# Patient Record
Sex: MALE | Race: WHITE | NOT HISPANIC OR LATINO | Employment: FULL TIME | URBAN - METROPOLITAN AREA
[De-identification: names, ages, dates, MRNs, and addresses within clinical notes are randomized per-mention and may not be internally consistent; named-entity substitution may affect disease eponyms.]

---

## 2020-03-03 ENCOUNTER — APPOINTMENT (EMERGENCY)
Dept: CT IMAGING | Facility: HOSPITAL | Age: 40
DRG: 580 | End: 2020-03-03
Payer: COMMERCIAL

## 2020-03-03 ENCOUNTER — HOSPITAL ENCOUNTER (INPATIENT)
Facility: HOSPITAL | Age: 40
LOS: 1 days | Discharge: HOME | DRG: 580 | End: 2020-03-04
Attending: EMERGENCY MEDICINE | Admitting: HOSPITALIST
Payer: COMMERCIAL

## 2020-03-03 DIAGNOSIS — L02.91 ABSCESS: Primary | ICD-10-CM

## 2020-03-03 DIAGNOSIS — L03.211 FACIAL CELLULITIS: ICD-10-CM

## 2020-03-03 PROBLEM — Z72.0 TOBACCO ABUSE: Status: ACTIVE | Noted: 2020-03-03

## 2020-03-03 PROBLEM — Z72.0 TOBACCO ABUSE: Chronic | Status: ACTIVE | Noted: 2020-03-03

## 2020-03-03 LAB
ALBUMIN SERPL BCP-MCNC: 4.4 G/DL (ref 3.5–5.7)
ALP SERPL-CCNC: 66 U/L (ref 40–150)
ALT SERPL W P-5'-P-CCNC: 19 U/L (ref 7–52)
ANION GAP SERPL CALCULATED.3IONS-SCNC: 8 MMOL/L (ref 4–13)
APTT PPP: 36 SECONDS (ref 23–37)
AST SERPL W P-5'-P-CCNC: 15 U/L (ref 13–39)
ATRIAL RATE: 79 BPM
BASOPHILS # BLD AUTO: 0 THOUSANDS/ΜL (ref 0–0.1)
BASOPHILS NFR BLD AUTO: 1 % (ref 0–2)
BILIRUB SERPL-MCNC: 0.6 MG/DL (ref 0.2–1)
BUN SERPL-MCNC: 12 MG/DL (ref 7–25)
CALCIUM SERPL-MCNC: 9.2 MG/DL (ref 8.6–10.5)
CHLORIDE SERPL-SCNC: 101 MMOL/L (ref 98–107)
CO2 SERPL-SCNC: 26 MMOL/L (ref 21–31)
CREAT SERPL-MCNC: 0.8 MG/DL (ref 0.7–1.3)
EOSINOPHIL # BLD AUTO: 0.2 THOUSAND/ΜL (ref 0–0.61)
EOSINOPHIL NFR BLD AUTO: 2 % (ref 0–5)
ERYTHROCYTE [DISTWIDTH] IN BLOOD BY AUTOMATED COUNT: 12.7 % (ref 11.5–14.5)
GFR SERPL CREATININE-BSD FRML MDRD: 112 ML/MIN/1.73SQ M
GLUCOSE SERPL-MCNC: 97 MG/DL (ref 65–99)
HCT VFR BLD AUTO: 49.9 % (ref 42–47)
HGB BLD-MCNC: 16.5 G/DL (ref 14–18)
INR PPP: 1.02 (ref 0.9–1.5)
LACTATE SERPL-SCNC: 0.6 MMOL/L (ref 0.5–2)
LYMPHOCYTES # BLD AUTO: 1.5 THOUSANDS/ΜL (ref 0.6–4.47)
LYMPHOCYTES NFR BLD AUTO: 18 % (ref 21–51)
MCH RBC QN AUTO: 31.3 PG (ref 26–34)
MCHC RBC AUTO-ENTMCNC: 33.1 G/DL (ref 31–37)
MCV RBC AUTO: 95 FL (ref 81–99)
MONOCYTES # BLD AUTO: 1.1 THOUSAND/ΜL (ref 0.17–1.22)
MONOCYTES NFR BLD AUTO: 13 % (ref 2–12)
NEUTROPHILS # BLD AUTO: 5.6 THOUSANDS/ΜL (ref 1.4–6.5)
NEUTS SEG NFR BLD AUTO: 66 % (ref 42–75)
P AXIS: 73 DEGREES
PLATELET # BLD AUTO: 204 THOUSANDS/UL (ref 149–390)
PMV BLD AUTO: 9.1 FL (ref 8.6–11.7)
POTASSIUM SERPL-SCNC: 3.8 MMOL/L (ref 3.5–5.5)
PR INTERVAL: 166 MS
PROT SERPL-MCNC: 7.2 G/DL (ref 6.4–8.9)
PROTHROMBIN TIME: 11.8 SECONDS (ref 10.2–13)
QRS AXIS: 72 DEGREES
QRSD INTERVAL: 72 MS
QT INTERVAL: 346 MS
QTC INTERVAL: 396 MS
RBC # BLD AUTO: 5.27 MILLION/UL (ref 4.3–5.9)
SODIUM SERPL-SCNC: 135 MMOL/L (ref 134–143)
T WAVE AXIS: 69 DEGREES
TROPONIN I SERPL-MCNC: <0.03 NG/ML
VENTRICULAR RATE: 79 BPM
WBC # BLD AUTO: 8.4 THOUSAND/UL (ref 4.8–10.8)

## 2020-03-03 PROCEDURE — 87070 CULTURE OTHR SPECIMN AEROBIC: CPT | Performed by: SPECIALIST

## 2020-03-03 PROCEDURE — 99223 1ST HOSP IP/OBS HIGH 75: CPT | Performed by: INTERNAL MEDICINE

## 2020-03-03 PROCEDURE — NC001 PR NO CHARGE: Performed by: PHYSICIAN ASSISTANT

## 2020-03-03 PROCEDURE — 84484 ASSAY OF TROPONIN QUANT: CPT | Performed by: EMERGENCY MEDICINE

## 2020-03-03 PROCEDURE — 87205 SMEAR GRAM STAIN: CPT | Performed by: SPECIALIST

## 2020-03-03 PROCEDURE — 36415 COLL VENOUS BLD VENIPUNCTURE: CPT | Performed by: EMERGENCY MEDICINE

## 2020-03-03 PROCEDURE — 99285 EMERGENCY DEPT VISIT HI MDM: CPT

## 2020-03-03 PROCEDURE — 93010 ELECTROCARDIOGRAM REPORT: CPT | Performed by: INTERNAL MEDICINE

## 2020-03-03 PROCEDURE — 0J910ZZ DRAINAGE OF FACE SUBCUTANEOUS TISSUE AND FASCIA, OPEN APPROACH: ICD-10-PCS | Performed by: SPECIALIST

## 2020-03-03 PROCEDURE — 87186 SC STD MICRODIL/AGAR DIL: CPT | Performed by: SPECIALIST

## 2020-03-03 PROCEDURE — 80053 COMPREHEN METABOLIC PANEL: CPT | Performed by: EMERGENCY MEDICINE

## 2020-03-03 PROCEDURE — 10060 I&D ABSCESS SIMPLE/SINGLE: CPT | Performed by: PHYSICIAN ASSISTANT

## 2020-03-03 PROCEDURE — 85730 THROMBOPLASTIN TIME PARTIAL: CPT | Performed by: EMERGENCY MEDICINE

## 2020-03-03 PROCEDURE — 96374 THER/PROPH/DIAG INJ IV PUSH: CPT

## 2020-03-03 PROCEDURE — 99253 IP/OBS CNSLTJ NEW/EST LOW 45: CPT | Performed by: SPECIALIST

## 2020-03-03 PROCEDURE — 85610 PROTHROMBIN TIME: CPT | Performed by: EMERGENCY MEDICINE

## 2020-03-03 PROCEDURE — 87040 BLOOD CULTURE FOR BACTERIA: CPT | Performed by: EMERGENCY MEDICINE

## 2020-03-03 PROCEDURE — 70487 CT MAXILLOFACIAL W/DYE: CPT

## 2020-03-03 PROCEDURE — 85025 COMPLETE CBC W/AUTO DIFF WBC: CPT | Performed by: EMERGENCY MEDICINE

## 2020-03-03 PROCEDURE — 96361 HYDRATE IV INFUSION ADD-ON: CPT

## 2020-03-03 PROCEDURE — 99285 EMERGENCY DEPT VISIT HI MDM: CPT | Performed by: EMERGENCY MEDICINE

## 2020-03-03 PROCEDURE — 93005 ELECTROCARDIOGRAM TRACING: CPT

## 2020-03-03 PROCEDURE — 87147 CULTURE TYPE IMMUNOLOGIC: CPT | Performed by: SPECIALIST

## 2020-03-03 PROCEDURE — 83605 ASSAY OF LACTIC ACID: CPT | Performed by: EMERGENCY MEDICINE

## 2020-03-03 RX ORDER — ACETAMINOPHEN 325 MG/1
650 TABLET ORAL EVERY 4 HOURS PRN
Status: DISCONTINUED | OUTPATIENT
Start: 2020-03-03 | End: 2020-03-04 | Stop reason: HOSPADM

## 2020-03-03 RX ORDER — NICOTINE 21 MG/24HR
1 PATCH, TRANSDERMAL 24 HOURS TRANSDERMAL DAILY
Status: DISCONTINUED | OUTPATIENT
Start: 2020-03-03 | End: 2020-03-04 | Stop reason: HOSPADM

## 2020-03-03 RX ORDER — CEFEPIME HYDROCHLORIDE 2 G/50ML
2000 INJECTION, SOLUTION INTRAVENOUS EVERY 12 HOURS
Status: DISCONTINUED | OUTPATIENT
Start: 2020-03-03 | End: 2020-03-04

## 2020-03-03 RX ORDER — LIDOCAINE HYDROCHLORIDE AND EPINEPHRINE 10; 10 MG/ML; UG/ML
20 INJECTION, SOLUTION INFILTRATION; PERINEURAL ONCE
Status: COMPLETED | OUTPATIENT
Start: 2020-03-03 | End: 2020-03-03

## 2020-03-03 RX ORDER — ONDANSETRON 2 MG/ML
4 INJECTION INTRAMUSCULAR; INTRAVENOUS EVERY 6 HOURS PRN
Status: DISCONTINUED | OUTPATIENT
Start: 2020-03-03 | End: 2020-03-04 | Stop reason: HOSPADM

## 2020-03-03 RX ADMIN — VANCOMYCIN HYDROCHLORIDE 1750 MG: 1 INJECTION, POWDER, LYOPHILIZED, FOR SOLUTION INTRAVENOUS at 04:44

## 2020-03-03 RX ADMIN — SODIUM CHLORIDE 1000 ML: 0.9 INJECTION, SOLUTION INTRAVENOUS at 01:50

## 2020-03-03 RX ADMIN — IOHEXOL 100 ML: 350 INJECTION, SOLUTION INTRAVENOUS at 02:34

## 2020-03-03 RX ADMIN — VANCOMYCIN HYDROCHLORIDE 1250 MG: 1 INJECTION, POWDER, LYOPHILIZED, FOR SOLUTION INTRAVENOUS at 21:56

## 2020-03-03 RX ADMIN — LIDOCAINE HYDROCHLORIDE,EPINEPHRINE BITARTRATE 20 ML: 10; .01 INJECTION, SOLUTION INFILTRATION; PERINEURAL at 11:15

## 2020-03-03 RX ADMIN — VANCOMYCIN HYDROCHLORIDE 1250 MG: 1 INJECTION, POWDER, LYOPHILIZED, FOR SOLUTION INTRAVENOUS at 14:09

## 2020-03-03 RX ADMIN — ACETAMINOPHEN 650 MG: 325 TABLET ORAL at 11:56

## 2020-03-03 RX ADMIN — ACETAMINOPHEN 650 MG: 325 TABLET ORAL at 23:47

## 2020-03-03 RX ADMIN — CEFEPIME HYDROCHLORIDE 2000 MG: 2 INJECTION, SOLUTION INTRAVENOUS at 19:41

## 2020-03-03 RX ADMIN — CEFEPIME HYDROCHLORIDE 2000 MG: 2 INJECTION, SOLUTION INTRAVENOUS at 08:26

## 2020-03-03 NOTE — CONSULTS
Consult- Sakina Abdalla 1980, 35 y o  male MRN: [id number]    Unit/Bed#: ICU 06 Encounter: [id number]    Primary Care Provider: Ruth Bose DO   Date and time admitted to hospital: 3/3/2020  1:15 AM      Inpatient consult to Acute Care Surgery  Consult performed by: Lexi Nelson PA-C  Consult ordered by: Maki Ardon PA-C          * Facial cellulitis with abcess  [facility] day 1 with facial cellulitis and abscess of the mid forehead noted on CT  Clinically stable, pain controlled, no active drainage  Hemodynamically stable, AFVSS  Hematologically stable, labs WNL, WBC 8k, blood cultures x 2 pending  Abscess appears amenable to bedside I&D and cultures  Have discussed with Dr Jennifer Varghese who will evaluate the patient later this morning  Continue IV fluids, IV antibiotics with cefepime/vancomycin  Follow-up on blood cultures when available  Wound care orders following procedure  History of Present Illness   HPI:  Sakina Abdalla is a 35 y o  male admitted with facial cellulitis and forehead abscess by CT  Patient popped a pimple on his mid forehead on Saturday 02/29/2020  [employer] recurred on Sunday 03/01/2020 and he again manually expressed purulence from it  On Monday 03/02/2020 there is no recurrence however after working noted significant swelling of the forehead and eyes bilaterally  He tried to drain it with a pin without success  He notes he could see something white under the skin but could not remove it  He took 2 Benadryl yesterday and went to bed  He woke up until the night with discomfort prompting evaluation in the ER  No associated fevers or chills  Able to open eyes with normal vision bilaterally  Pain currently mild at rest   No prior history a similar process  Denies having a lump in the area prior to the onset of this acute condition  Review of Systems   Constitutional: Negative for chills and fever     Respiratory: Negative for shortness of breath  Cardiovascular: Negative for chest pain  Gastrointestinal: Negative for abdominal pain  Skin: Positive for wound  Negative for rash  Neurological: Negative for headaches  Historical Information   History reviewed  No pertinent past medical history  History reviewed  No pertinent surgical history    Social History   Social History     Substance and Sexual Activity   Alcohol Use Never    Frequency: Never     Social History     Substance and Sexual Activity   Drug Use Yes    Types: Marijuana     Social History     Tobacco Use   Smoking Status Current Every Day Smoker    Packs/day: 1 00   Smokeless Tobacco Never Used     E-Cigarette/Vaping     E-Cigarette/Vaping Substances     Family History:   Family History   Problem Relation Age of Onset    Cancer Mother     Diabetes Neg Hx     Heart disease Neg Hx        Meds/Allergies   PTA meds:   None     No Known Allergies    Objective   First Vitals:   Blood Pressure: 127/79 (03/03/20 0121)  Pulse: 100 (03/03/20 0121)  Temperature: 99 1 °F (37 3 °C) (03/03/20 0121)  Temp Source: Tympanic (03/03/20 0121)  Respirations: 18 (03/03/20 0121)  Height: 6' (182 9 cm) (03/03/20 0121)  Weight - Scale: 88 5 kg (195 lb) (03/03/20 0121)  SpO2: 98 % (03/03/20 0121)    Current Vitals:   Blood Pressure: 113/73 (03/03/20 0800)  Pulse: 84 (03/03/20 0800)  Temperature: 98 9 °F (37 2 °C) (03/03/20 0800)  Temp Source: Tympanic (03/03/20 0800)  Respirations: 18 (03/03/20 0800)  Height: 6' (182 9 cm) (03/03/20 6446)  Weight - Scale: 83 kg (182 lb 15 7 oz) (03/03/20 0624)  SpO2: 98 % (03/03/20 0800)      Intake/Output Summary (Last 24 hours) at 3/3/2020 0833  Last data filed at 3/3/2020 1317  Gross per 24 hour   Intake 1395 83 ml   Output    Net 1395 83 ml       Invasive Devices     Peripheral Intravenous Line            Peripheral IV 03/03/20 Right Wrist less than 1 day                Physical Exam   Constitutional: He is oriented to person, place, and time  He appears well-developed and well-nourished  No distress  HENT:   Head: Normocephalic and atraumatic  Eyes: Pupils are equal, round, and reactive to light  Conjunctivae and EOM are normal    Neck: Normal range of motion  Pulmonary/Chest: No respiratory distress  Musculoskeletal: Normal range of motion  Neurological: He is alert and oriented to person, place, and time  Skin: Skin is warm and dry  Capillary refill takes less than 2 seconds  He is not diaphoretic  Psychiatric: He has a normal mood and affect  His behavior is normal            Lab Results:   I have personally reviewed pertinent lab results  , CBC:   Lab Results   Component Value Date    WBC 8 40 03/03/2020    HGB 16 5 03/03/2020    HCT 49 9 (H) 03/03/2020    MCV 95 03/03/2020     03/03/2020    MCH 31 3 03/03/2020    MCHC 33 1 03/03/2020    RDW 12 7 03/03/2020    MPV 9 1 03/03/2020   , CMP:   Lab Results   Component Value Date    SODIUM 135 03/03/2020    K 3 8 03/03/2020     03/03/2020    CO2 26 03/03/2020    BUN 12 03/03/2020    CREATININE 0 80 03/03/2020    CALCIUM 9 2 03/03/2020    AST 15 03/03/2020    ALT 19 03/03/2020    ALKPHOS 66 03/03/2020    EGFR 112 03/03/2020     Imaging: I have personally reviewed pertinent reports  and I have personally reviewed pertinent films in PACS  EKG, Pathology, and Other Studies: I have personally reviewed pertinent reports  Code Status: Level 1 - Full Code  Advance Directive and Living Will:      Power of :    POLST:      Counseling / Coordination of Care  Total floor / unit time spent today 25 minutes  Greater than 50% of total time was spent with the patient and / or family counseling and / or coordination of care  A description of the counseling / coordination of care: patient education, treatment planning

## 2020-03-03 NOTE — PROGRESS NOTES
Vancomycin Assessment    Tiffany Gutiérrez is a 35 y o  male who is currently receiving vancomycin 1250mg IV Q12H for skin-soft tissue infection     Relevant clinical data and objective history reviewed:  Creatinine   Date Value Ref Range Status   03/03/2020 0 80 0 70 - 1 30 mg/dL Final     Comment:     Standardized to IDMS reference method     /83   Pulse 97   Temp 98 9 °F (37 2 °C) (Tympanic)   Resp 18   Ht 6' (1 829 m)   Wt 88 5 kg (195 lb)   SpO2 99%   BMI 26 45 kg/m²   No intake/output data recorded  Lab Results   Component Value Date/Time    BUN 12 03/03/2020 01:46 AM    WBC 8 40 03/03/2020 01:46 AM    HGB 16 5 03/03/2020 01:46 AM    HCT 49 9 (H) 03/03/2020 01:46 AM    MCV 95 03/03/2020 01:46 AM     03/03/2020 01:46 AM     Temp Readings from Last 3 Encounters:   03/03/20 98 9 °F (37 2 °C) (Tympanic)     Vancomycin Days of Therapy: 1    Assessment/Plan  The patient is currently on vancomycin utilizing scheduled dosing based on actual body weight  Baseline risks associated with therapy include: none   The patient is currently receiving 1250mg IV Q12H and after clinical evaluation will be changed to 1250 mg IV Q8H  Pharmacy will also follow closely for s/sx of nephrotoxicity, infusion reactions and appropriateness of therapy  BMP and CBC will be ordered per protocol  Plan for trough as patient approaches steady state, prior to the 4th  dose at approximately 0530 on 3/4/20  Due to infection severity, will target a trough of 15-20 (appropriate for most indications)   Pharmacy will continue to follow the patients culture results and clinical progress daily      Eitan Galvan

## 2020-03-03 NOTE — ED PROVIDER NOTES
History  Chief Complaint   Patient presents with    Abscess     reports facial abscess since this past Saturday     HPI    Patient is a 76-year-old male that reports to the emergency department with a facial abscess that started on Saturday  He notes that he was able to express some pus from the abscess  No lightheadedness or dizziness  No fevers, chills, sweats  He does have an achy headache  No focal neurological defects  No vomiting or diarrhea  No dysuria, hematuria  Currently, the patient has swelling in his forehead, see media image from this date  CN 2-12 intact  GCS 15  AOx3  Achi pain over the forehead causing a headache  No altered mental status  He was able to express some puss from the wound  MDM well appearing 35 yom, concern for subgaleal infection will image  Sp[facility] with dr William Kauf[facility] from [facility], will admit here  Spoke with gen surg Dr Pacheco Rust, he will have his team see the patient in the AM for I/d  None       History reviewed  No pertinent past medical history  History reviewed  No pertinent surgical history  History reviewed  No pertinent family history  I have reviewed and agree with the history as documented  E-Cigarette/Vaping     E-Cigarette/Vaping Substances     Social History     Tobacco Use    Smoking status: Current Every Day Smoker     Packs/day: 1 00    Smokeless tobacco: Never Used   Substance Use Topics    Alcohol use: Never     Frequency: Never    Drug use: Yes     Types: Marijuana       Review of Systems   Skin: Positive for color change and wound  All other systems reviewed and are negative  Physical Exam  Physical Exam   Constitutional: He is oriented to person, place, and time  He appears well-developed and well-nourished  HENT:   Head: Normocephalic and atraumatic  Eyes: Pupils are equal, round, and reactive to light  EOM are normal    Neck: Normal range of motion  Neck supple     Cardiovascular: Normal rate, regular rhythm and normal heart sounds  No murmur heard  Pulmonary/Chest: Effort normal and breath sounds normal  No respiratory distress  He has no wheezes  Abdominal: Soft  Bowel sounds are normal  He exhibits no distension  There is no tenderness  Musculoskeletal: Normal range of motion  He exhibits no edema or tenderness  Neurological: He is alert and oriented to person, place, and time  No cranial nerve deficit  Coordination normal    Skin: Skin is warm and dry  He is not diaphoretic  No erythema  Psychiatric: He has a normal mood and affect  His behavior is normal    Nursing note and vitals reviewed  Vital Signs  ED Triage Vitals [03/03/20 0121]   Temperature Pulse Respirations Blood Pressure SpO2   99 1 °F (37 3 °C) 100 18 127/79 98 %      Temp Source Heart Rate Source Patient Position - Orthostatic VS BP Location FiO2 (%)   Tympanic -- Sitting Left arm --      Pain Score       No Pain           Vitals:    03/03/20 0121   BP: 127/79   Pulse: 100   Patient Position - Orthostatic VS: Sitting         Visual Acuity      ED Medications  Medications   vancomycin (VANCOCIN) 1,750 mg in sodium chloride 0 9 % 500 mL IVPB (1,750 mg Intravenous New Bag 3/3/20 0444)   sodium chloride 0 9 % bolus 1,000 mL (0 mL Intravenous Stopped 3/3/20 0246)   iohexol (OMNIPAQUE) 350 MG/ML injection (SINGLE-DOSE) 100 mL (100 mL Intravenous Given 3/3/20 0234)       Diagnostic Studies  Results Reviewed     Procedure Component Value Units Date/Time    Blood culture #2 [[id number]] Collected:  03/03/20 0146    Lab Status: In process Specimen:  Blood from Arm, Right Updated:  03/03/20 0250    Blood culture #1 [[id number]] Collected:  03/03/20 0206    Lab Status:   In process Specimen:  Blood from Arm, Left Updated:  03/03/20 0249    Comprehensive metabolic panel [[id number]] Collected:  03/03/20 0146    Lab Status:  Final result Specimen:  Blood from Arm, Right Updated:  03/03/20 0219     Sodium 135 mmol/L      Potassium 3 8 mmol/L Chloride 101 mmol/L      CO2 26 mmol/L      ANION GAP 8 mmol/L      BUN 12 mg/dL      Creatinine 0 80 mg/dL      Glucose 97 mg/dL      Calcium 9 2 mg/dL      AST 15 U/L      ALT 19 U/L      Alkaline Phosphatase 66 U/L      Total Protein 7 2 g/dL      Albumin 4 4 g/dL      Total Bilirubin 0 60 mg/dL      eGFR 112 ml/min/1 73sq m     Narrative:       [place] guidelines for Chronic Kidney Disease (CKD):     Stage 1 with normal or high GFR (GFR > 90 mL/min/1 73 square meters)    Stage 2 Mild CKD (GFR = 60-89 mL/min/1 73 square meters)    Stage 3A Moderate CKD (GFR = 45-59 mL/min/1 73 square meters)    Stage 3B Moderate CKD (GFR = 30-44 mL/min/1 73 square meters)    Stage 4 Severe CKD (GFR = 15-29 mL/min/1 73 square meters)    Stage 5 End Stage CKD (GFR <15 mL/min/1 73 square meters)  Note: GFR calculation is accurate only with a steady state creatinine    Troponin I [[id number]]  (Normal) Collected:  03/03/20 0146    Lab Status:  Final result Specimen:  Blood from Arm, Right Updated:  03/03/20 0217     Troponin I <0 03 ng/mL     Lactic acid x2 Q2H [[id number]]  (Normal) Collected:  03/03/20 0147    Lab Status:  Final result Specimen:  Blood from Arm, Right Updated:  03/03/20 0214     LACTIC ACID 0 6 mmol/L     Narrative:       Result may be elevated if tourniquet was used during collection      Prince Abdalla [[id number]]  (Normal) Collected:  03/03/20 0146    Lab Status:  Final result Specimen:  Blood from Arm, Right Updated:  03/03/20 0210     Protime 11 8 seconds      INR 1 02    APTT [[id number]]  (Normal) Collected:  03/03/20 0146    Lab Status:  Final result Specimen:  Blood from Arm, Right Updated:  03/03/20 0210     PTT 36 seconds     CBC and differential [[id number]]  (Abnormal) Collected:  03/03/20 0146    Lab Status:  Final result Specimen:  Blood from Arm, Right Updated:  03/03/20 0205     WBC 8 40 Thousand/uL      RBC 5 27 Million/uL      Hemoglobin 16 5 g/dL      Hematocrit 49 9 % MCV 95 fL      MCH 31 3 pg      MCHC 33 1 g/dL      RDW 12 7 %      MPV 9 1 fL      Platelets 309 Thousands/uL      Neutrophils Relative 66 %      Lymphocytes Relative 18 %      Monocytes Relative 13 %      Eosinophils Relative 2 %      Basophils Relative 1 %      Neutrophils Absolute 5 60 Thousands/µL      Lymphocytes Absolute 1 50 Thousands/µL      Monocytes Absolute 1 10 Thousand/µL      Eosinophils Absolute 0 20 Thousand/µL      Basophils Absolute 0 00 Thousands/µL     Lactic acid x2 Q2H [[id number]]     Lab Status:  No result Specimen:  Blood     UA w Reflex to Microscopic w Reflex to Culture [[id number]]     Lab Status:  No result Specimen:  Urine                  CT facial bones with contrast   Final Result by Alberto Green MD (03/03 0327)      2 5 x 1 5 x 0 5 cm encapsulated collection within the subcutaneous soft tissues of the mid frontal scalp extending to the skin surface compatible with an abscess  There is moderate surrounding subcutaneous inflammatory stranding  No evidence of adjacent osseous erosion  Well aerated bilateral frontal sinuses  Follow-up with ultrasound could be performed to assess for improvement/resolution  Workstation performed: [id number]                    Procedures  Procedures         ED Course                               MDM      Disposition  Final diagnoses:   Abscess     Time reflects when diagnosis was documented in both MDM as applicable and the Disposition within this note     Time User Action Codes Description Comment    3/3/2020  4:56 AM Parth Barboza Add [L02 91] Abscess       ED Disposition     ED Disposition Condition Date/Time Comment    Admit Stable Tue Mar 3, 2020  4:56 AM Case was discussed with preeti ap and the patient's admission status was agreed to be Admission Status: inpatient status to the service of Dr Ronda Rangel           Follow-up Information    None         Patient's Medications    No medications on file     No discharge procedures on file      PDMP Review     None          ED Provider  Electronically Signed by           Rigoberto Medina MD  03/03/20 2311

## 2020-03-03 NOTE — ASSESSMENT & PLAN NOTE
Hospital day 1 with facial cellulitis and abscess of the mid forehead noted on CT  Clinically stable, pain controlled, no active drainage  Hemodynamically stable, AFVSS  Hematologically stable, labs WNL, WBC 8k, blood cultures x 2 pending  Abscess appears amenable to bedside I&D and cultures  Have discussed with Dr Josie Guerrero who will evaluate the patient later this morning  Continue IV fluids, IV antibiotics with cefepime/vancomycin  Follow-up on blood cultures when available  Wound care orders following procedure

## 2020-03-03 NOTE — SOCIAL WORK
Pt was sleeping at 16:25 unable to assess, pt remains in ICU, need to reasses d/c plan and d/c needs,

## 2020-03-03 NOTE — PLAN OF CARE
Problem: PAIN - ADULT  Goal: Verbalizes/displays adequate comfort level or baseline comfort level  Description  Interventions:  - Encourage patient to monitor pain and request assistance  - Assess pain using appropriate pain scale  - Administer analgesics based on type and severity of pain and evaluate response  - Implement non-pharmacological measures as appropriate and evaluate response  - Consider cultural and social influences on pain and pain management  - Notify physician/advanced practitioner if interventions unsuccessful or patient reports new pain  Outcome: Progressing     Problem: INFECTION - ADULT  Goal: Absence or prevention of progression during hospitalization  Description  INTERVENTIONS:  - Assess and monitor for signs and symptoms of infection  - Monitor lab/diagnostic results  - Monitor all insertion sites, i e  indwelling lines, tubes, and drains  - Monitor endotracheal if appropriate and nasal secretions for changes in amount and color  - [place] appropriate cooling/warming therapies per order  - Administer medications as ordered  - Instruct and encourage patient and family to use good hand hygiene technique  - Identify and instruct in appropriate isolation precautions for identified infection/condition  Outcome: Progressing  Goal: Absence of fever/infection during neutropenic period  Description  INTERVENTIONS:  - Monitor WBC    Outcome: Progressing     Problem: SAFETY ADULT  Goal: Patient will remain free of falls  Description  INTERVENTIONS:  - Assess patient frequently for physical needs  -  Identify cognitive and physical deficits and behaviors that affect risk of falls    -  [place] fall precautions as indicated by assessment   - Educate patient/family on patient safety including physical limitations  - Instruct patient to call for assistance with activity based on assessment  - Modify environment to reduce risk of injury  - Consider OT/PT consult to assist with strengthening/mobility  Outcome: Progressing  Goal: Maintain or return to baseline ADL function  Description  INTERVENTIONS:  -  Assess patient's ability to carry out ADLs; assess patient's baseline for ADL function and identify physical deficits which impact ability to perform ADLs (bathing, care of mouth/teeth, toileting, grooming, dressing, etc )  - Assess/evaluate cause of self-care deficits   - Assess range of motion  - Assess patient's mobility; develop plan if impaired  - Assess patient's need for assistive devices and provide as appropriate  - Encourage maximum independence but intervene and supervise when necessary  - Involve family in performance of ADLs  - Assess for home care needs following discharge   - Consider OT consult to assist with ADL evaluation and planning for discharge  - Provide patient education as appropriate  Outcome: Progressing  Goal: Maintain or return mobility status to optimal level  Description  INTERVENTIONS:  - Assess patient's baseline mobility status (ambulation, transfers, stairs, etc )    - Identify cognitive and physical deficits and behaviors that affect mobility  - Identify mobility aids required to assist with transfers and/or ambulation (gait belt, sit-to-stand, lift, walker, cane, etc )  - [place] fall precautions as indicated by assessment  - Record patient progress and toleration of activity level on Mobility SBAR; progress patient to next Phase/Stage  - Instruct patient to call for assistance with activity based on assessment  - Consider rehabilitation consult to assist with strengthening/weightbearing, etc   Outcome: Progressing     Problem: DISCHARGE PLANNING  Goal: Discharge to home or other facility with appropriate resources  Description  INTERVENTIONS:  - Identify barriers to discharge w/patient and caregiver  - Arrange for needed discharge resources and transportation as appropriate  - Identify discharge learning needs (meds, wound care, etc )  - Arrange for interpretive services to assist at discharge as needed  - Refer to Case Management Department for coordinating discharge planning if the patient needs post-hospital services based on physician/advanced practitioner order or complex needs related to functional status, cognitive ability, or social support system  Outcome: Progressing     Problem: Knowledge Deficit  Goal: Patient/family/caregiver demonstrates understanding of disease process, treatment plan, medications, and discharge instructions  Description  Complete learning assessment and assess knowledge base    Interventions:  - Provide teaching at level of understanding  - Provide teaching via preferred learning methods  Outcome: Progressing

## 2020-03-03 NOTE — NURSING NOTE
Resting comfortably  Dr Bhaskar Santos recently at bedside for incision and drainage  Consent obtained prior to procedure  Time out as per protocol  Tolerated well  Specimen to lab as obtained  Rails up times 2; safety maintained

## 2020-03-03 NOTE — ASSESSMENT & PLAN NOTE
· IV antibiotics  · Surgery consult  · Follow up blood cultures  · CT: 2 5 x 1 5 x 0 5 cm encapsulated collection within the subcutaneous soft tissues of the mid frontal scalp extending to the skin surface compatible with an abscess   There is moderate surrounding subcutaneous inflammatory stranding

## 2020-03-03 NOTE — NURSING NOTE
Resting to R side  Afternoon assessment as charted  Remains with B/L facial/eye and forehead edema  No c/o headache, nausea or increasing pain  States, "I feel like my eyes and face are more swollen"  Describes edema as tightness  Continuing antibiotics as ordered  Rails up times 3 with callbell in reach

## 2020-03-03 NOTE — H&P
H&P- Lashay Mcguire 1980, 35 y o  male MRN: [id number]    Unit/Bed#: ED 03 Encounter: [id number]    Primary Care Provider: Natalie Casillas DO   Date and time admitted to hospital: 3/3/2020  1:15 AM        * Facial cellulitis with abcess  Assessment & Plan  · IV antibiotics  · Surgery consult  · Follow up blood cultures  · CT: 2 5 x 1 5 x 0 5 cm encapsulated collection within the subcutaneous soft tissues of the mid frontal scalp extending to the skin surface compatible with an abscess   There is moderate surrounding subcutaneous inflammatory stranding  Tobacco abuse  Assessment & Plan  · Nicotine patch    VTE Prophylaxis: venadynes for possible I&D  Code Status:  Full code  POLST: POLST is not applicable to this patient  Discussion with patient    Anticipated Length of Stay:  Patient will be admitted on an Inpatient basis with an anticipated length of stay of  > 2 midnights  Justification for Hospital Stay:  IV antibiotics, specialist input    Chief Complaint:   Facial abscess    History of Present Illness:    Lashay Mcguire is a 35 y o  male who presents with facial abscess  Saturday woke up with a small pimple,squeezed and stuff came out  Was out in the garage on Saturday and not sure if he got bit by something  Sunday he was squeezing it all day and getting pus out  Monday he went to work and noted left side of face was swollen in the evening his right side of face swelled  He took 2 benadryl and went to sleep  Woke up with head pounding and decided to come to the ER to get evaluated  Review of Systems:  Review of Systems   Constitutional: Negative for chills  HENT: Positive for facial swelling  Negative for rhinorrhea, sore throat and trouble swallowing  Eyes: Negative for discharge and redness  Respiratory: Negative for cough and shortness of breath  Cardiovascular: Negative for chest pain and leg swelling     Gastrointestinal: Negative for abdominal pain, diarrhea, nausea and vomiting  Genitourinary: Negative for dysuria and hematuria  Musculoskeletal: Negative for back pain, myalgias and neck pain  Skin: Positive for color change  Neurological: Positive for headaches  Negative for dizziness  Psychiatric/Behavioral: Negative for agitation and confusion  Past Medical and Surgical History:   History reviewed  No pertinent past medical history  History reviewed  No pertinent surgical history  Meds/Allergies:  Prior to Admission medications    Not on File     I have reviewed home medications with patient personally  Allergies: No Known Allergies    Social History:  Marital Status: Single   Occupation:  for [employer]  Patient Pre-hospital Living Situation:  Home  Patient Pre-hospital Level of Mobility:  Mobile  Patient Pre-hospital Diet Restrictions:  None  Substance Use History:     Social History     Substance and Sexual Activity   Alcohol Use Never    Frequency: Never     Social History     Tobacco Use   Smoking Status Current Every Day Smoker    Packs/day: 1 00   Smokeless Tobacco Never Used     Social History     Substance and Sexual Activity   Drug Use Yes    Types: Marijuana       Family History:  I have reviewed the patients family history    Physical Exam:   Vitals:   Blood Pressure: 129/83 (03/03/20 0535)  Pulse: 97 (03/03/20 0535)  Temperature: 98 9 °F (37 2 °C) (03/03/20 0535)  Temp Source: Tympanic (03/03/20 0535)  Respirations: 18 (03/03/20 0535)  Height: 6' (182 9 cm) (03/03/20 0121)  Weight - Scale: 88 5 kg (195 lb) (03/03/20 0121)  SpO2: 99 % (03/03/20 0535)    Physical Exam   Constitutional: He is oriented to person, place, and time  He appears well-developed and well-nourished  HENT:   Head: Normocephalic and atraumatic  Eyes: Conjunctivae and EOM are normal  Right eye exhibits no discharge  Left eye exhibits no discharge  Significant orbital edema bilateral   Neck: Normal range of motion  No tracheal deviation present  Cardiovascular: Normal rate, regular rhythm and normal heart sounds  Exam reveals no gallop and no friction rub  No murmur heard  Pulmonary/Chest: Effort normal and breath sounds normal  No respiratory distress  He has no wheezes  He has no rales  Abdominal: Soft  Bowel sounds are normal  He exhibits no distension and no mass  There is no tenderness  There is no guarding  Musculoskeletal: Normal range of motion  He exhibits no edema, tenderness or deformity  Neurological: He is alert and oriented to person, place, and time  Skin: Skin is warm and dry  No rash noted  No erythema  No pallor  Psychiatric: He has a normal mood and affect  His behavior is normal  Judgment and thought content normal    Nursing note and vitals reviewed  Additional Data:   Lab Results: I have personally reviewed pertinent reports  Results from last 7 days   Lab Units 03/03/20  0146   WBC Thousand/uL 8 40   HEMOGLOBIN g/dL 16 5   HEMATOCRIT % 49 9*   PLATELETS Thousands/uL 204   NEUTROS PCT % 66   LYMPHS PCT % 18*   MONOS PCT % 13*   EOS PCT % 2     Results from last 7 days   Lab Units 03/03/20  0146   POTASSIUM mmol/L 3 8   CHLORIDE mmol/L 101   CO2 mmol/L 26   BUN mg/dL 12   CREATININE mg/dL 0 80   CALCIUM mg/dL 9 2   ALK PHOS U/L 66   ALT U/L 19   AST U/L 15     Results from last 7 days   Lab Units 03/03/20  0146   INR  1 02       Imaging: I have personally reviewed pertinent reports  CT facial bones with contrast   Final Result by Josselyn Velasco MD (03/03 0327)      2 5 x 1 5 x 0 5 cm encapsulated collection within the subcutaneous soft tissues of the mid frontal scalp extending to the skin surface compatible with an abscess  There is moderate surrounding subcutaneous inflammatory stranding  No evidence of adjacent osseous erosion  Well aerated bilateral frontal sinuses  Follow-up with ultrasound could be performed to assess for improvement/resolution           Workstation performed: [id number] [employer] Records Reviewed: Yes     ** Please Note: This note has been constructed using a voice recognition system   **

## 2020-03-03 NOTE — UTILIZATION REVIEW
Initial Clinical Review    Admission: Date/Time/Statement: Admission Orders (From admission, onward)     Ordered        03/03/20 0456  Inpatient Admission (expected length of stay for this patient Order details is greater than two midnights)  Once                   Orders Placed This Encounter   Procedures    Inpatient Admission (expected length of stay for this patient Order details is greater than two midnights)     Standing Status:   Standing     Number of Occurrences:   1     Order Specific Question:   Admitting Physician     Answer:   Sarahi Callaway [[id number]]     Order Specific Question:   Level of Care     Answer:   Med Surg [[id number]]     Order Specific Question:   Estimated length of stay     Answer:   More than 2 Midnights     Order Specific Question:   Certification     Answer:   I certify that inpatient services are medically necessary for this patient for a duration of greater than two midnights  See H&P and MD Progress Notes for additional information about the patient's course of treatment  ED Arrival Information     Expected Arrival Acuity Means of Arrival Escorted By Service Admission Type    - 3/3/2020 01:09 Urgent Walk-In Family Member General Medicine Urgent    Arrival Complaint    abscess        Chief Complaint   Patient presents with    Abscess     reports facial abscess since this past Saturday     Assessment/Plan:    34 yo male,  To ER from home,  Admitted INPT status to [facility] for treatment of  Facial cellulitis w/abscess  Pt reports on 2/29 he woke up w/a small pimple on face -  Squeezed it and expressed small amount of exudate  On 3/1 pt reports he repeatedly squeezed area to express pus  On 3/2 noted swelling in Rt side of face, took 2 benadryl  On 3/3  Awakened w/head pounding & continued facial swelling - came to ER  EXAM:  Significant orbital edema bilateral, scalp swelling   Will continue IVAB's, consult surgery,  Follow blood cultures                3/3 SURGICAL CONSULT:   Will perform bedside I&D w/cultures  ED Triage Vitals   Temperature Pulse Respirations Blood Pressure SpO2   03/03/20 0121 03/03/20 0121 03/03/20 0121 03/03/20 0121 03/03/20 0121   99 1 °F (37 3 °C) 100 18 127/79 98 %      Temp Source Heart Rate Source Patient Position - Orthostatic VS BP Location FiO2 (%)   03/03/20 0121 03/03/20 0624 03/03/20 0121 03/03/20 0121 --   Tympanic Monitor Sitting Left arm       Pain Score       03/03/20 0121       No Pain        Wt Readings from Last 1 Encounters:   03/03/20 83 kg (182 lb 15 7 oz)     Additional Vital Signs:   03/03/20 0535  98 9 °F (37 2 °C)  97  18  129/83     03/03/20 0800  98 9 °F (37 2 °C)  84  18  113/73       Pertinent Labs/Diagnostic Test Results:   Ekg/cxr - none     3/3   CT: 2 5 x 1 5 x 0 5 cm encapsulated collection within the subcutaneous soft tissues of the mid frontal scalp extending to the skin surface compatible with an abscess   There is moderate surrounding subcutaneous inflammatory stranding      Results from last 7 days   Lab Units 03/03/20  0146   WBC Thousand/uL 8 40   HEMOGLOBIN g/dL 16 5   HEMATOCRIT % 49 9*   PLATELETS Thousands/uL 204   NEUTROS ABS Thousands/µL 5 60         Results from last 7 days   Lab Units 03/03/20  0146   SODIUM mmol/L 135   POTASSIUM mmol/L 3 8   CHLORIDE mmol/L 101   CO2 mmol/L 26   ANION GAP mmol/L 8   BUN mg/dL 12   CREATININE mg/dL 0 80   EGFR ml/min/1 73sq m 112   CALCIUM mg/dL 9 2     Results from last 7 days   Lab Units 03/03/20  0146   AST U/L 15   ALT U/L 19   ALK PHOS U/L 66   TOTAL PROTEIN g/dL 7 2   ALBUMIN g/dL 4 4   TOTAL BILIRUBIN mg/dL 0 60         Results from last 7 days   Lab Units 03/03/20  0146   GLUCOSE RANDOM mg/dL 97         Results from last 7 days   Lab Units 03/03/20  0146   TROPONIN I ng/mL <0 03         Results from last 7 days   Lab Units 03/03/20  0146   PROTIME seconds 11 8   INR  1 02   PTT seconds 36     Results from last 7 days   Lab Units 03/03/20  0147   LACTIC ACID mmol/L 0 6   ED Treatment:   Medication Administration from 03/03/2020 0109 to 03/03/2020 0618       Date/Time Order Dose Route Action     03/03/2020 0150 sodium chloride 0 9 % bolus 1,000 mL 1,000 mL Intravenous New Bag     03/03/2020 0444 vancomycin (VANCOCIN) 1,750 mg in sodium chloride 0 9 % 500 mL IVPB 1,750 mg Intravenous New Bag        History reviewed  No pertinent past medical history  Present on Admission:   Facial cellulitis with abcess   Tobacco abuse      Admitting Diagnosis: Abscess [L02 91]  Facial cellulitis [L03 211]  Age/Sex: 35 y o  male     Admission Orders:  Consult gen surgery;   Reg diet;  Continuous pulse oximetry and cardiac monitoring;      Scheduled Medications:    Medications:  cefepime 2,000 mg Intravenous Q12H   lidocaine-epinephrine 20 mL Infiltration Once   nicotine 1 patch Transdermal Daily   vancomycin 15 mg/kg Intravenous Q8H     Continuous IV Infusions:     PRN Meds:    acetaminophen 650 mg Oral Q4H PRN   ondansetron 4 mg Intravenous Q6H PRN       Network Utilization Review Department  [email or web address]  ATTENTION: Please call with any questions or concerns to [phone] and carefully listen to the prompts so that you are directed to the right person  All voicemails are confidential   Alannah Mcclure all requests for admission clinical reviews, approved or denied determinations and any other requests to dedicated fax number below belonging to the campus where the patient is receiving treatment   List of dedicated fax numbers for the Facilities:  FACILITY NAME UR FAX NUMBER   ADMISSION DENIALS (Administrative/Medical Necessity) [phone]   [address] [place] [address] (Maternity/NICU/Pediatrics) [phone]   [facility] [phone]   [facility] [phone]   [facility] [phone]   Jayshree Gambino [address] [phone] [facility]  [phone]   [address] [facility]  [phone] [address] [phone]

## 2020-03-04 VITALS
TEMPERATURE: 98.8 F | OXYGEN SATURATION: 96 % | WEIGHT: 182.98 LBS | HEIGHT: 72 IN | BODY MASS INDEX: 24.78 KG/M2 | DIASTOLIC BLOOD PRESSURE: 61 MMHG | SYSTOLIC BLOOD PRESSURE: 102 MMHG | RESPIRATION RATE: 18 BRPM | HEART RATE: 80 BPM

## 2020-03-04 LAB
ANION GAP SERPL CALCULATED.3IONS-SCNC: 8 MMOL/L (ref 4–13)
BUN SERPL-MCNC: 10 MG/DL (ref 7–25)
CALCIUM SERPL-MCNC: 9.2 MG/DL (ref 8.6–10.5)
CHLORIDE SERPL-SCNC: 102 MMOL/L (ref 98–107)
CO2 SERPL-SCNC: 26 MMOL/L (ref 21–31)
CREAT SERPL-MCNC: 0.73 MG/DL (ref 0.7–1.3)
GFR SERPL CREATININE-BSD FRML MDRD: 116 ML/MIN/1.73SQ M
GLUCOSE SERPL-MCNC: 94 MG/DL (ref 65–99)
POTASSIUM SERPL-SCNC: 3.9 MMOL/L (ref 3.5–5.5)
SODIUM SERPL-SCNC: 136 MMOL/L (ref 134–143)
VANCOMYCIN TROUGH SERPL-MCNC: 9.9 UG/ML (ref 5–12)

## 2020-03-04 PROCEDURE — 80202 ASSAY OF VANCOMYCIN: CPT | Performed by: PHYSICIAN ASSISTANT

## 2020-03-04 PROCEDURE — 80048 BASIC METABOLIC PNL TOTAL CA: CPT | Performed by: PHYSICIAN ASSISTANT

## 2020-03-04 PROCEDURE — 99024 POSTOP FOLLOW-UP VISIT: CPT | Performed by: SPECIALIST

## 2020-03-04 PROCEDURE — 99239 HOSP IP/OBS DSCHRG MGMT >30: CPT | Performed by: INTERNAL MEDICINE

## 2020-03-04 RX ORDER — DOXYCYCLINE 100 MG/1
100 TABLET ORAL 2 TIMES DAILY
Qty: 14 TABLET | Refills: 0 | Status: SHIPPED | OUTPATIENT
Start: 2020-03-04 | End: 2020-03-11

## 2020-03-04 RX ORDER — CEPHALEXIN 500 MG/1
500 CAPSULE ORAL EVERY 6 HOURS SCHEDULED
Qty: 28 CAPSULE | Refills: 0 | Status: SHIPPED | OUTPATIENT
Start: 2020-03-04 | End: 2020-03-11

## 2020-03-04 RX ORDER — KETOROLAC TROMETHAMINE 30 MG/ML
30 INJECTION, SOLUTION INTRAMUSCULAR; INTRAVENOUS EVERY 6 HOURS PRN
Status: DISCONTINUED | OUTPATIENT
Start: 2020-03-04 | End: 2020-03-04 | Stop reason: HOSPADM

## 2020-03-04 RX ADMIN — CEFEPIME HYDROCHLORIDE 2000 MG: 2 INJECTION, SOLUTION INTRAVENOUS at 07:34

## 2020-03-04 RX ADMIN — VANCOMYCIN HYDROCHLORIDE 1250 MG: 1 INJECTION, POWDER, LYOPHILIZED, FOR SOLUTION INTRAVENOUS at 05:38

## 2020-03-04 RX ADMIN — MUPIROCIN: 20 OINTMENT TOPICAL at 13:50

## 2020-03-04 NOTE — NURSING NOTE
DC instructions reviewed with patient and mother  Voices full understanding  IV sites clear of redness/swelling; 2X2 applied with tape to secure

## 2020-03-04 NOTE — PROGRESS NOTES
Progress Note - General Surgery   Jose Ramon Barroso 35 y o  male MRN: [id number]  Unit/Bed#: ICU 06 Encounter: [id number]    Assessment:  Patient comfortable  VSS AF  Cellulitis markedly improved  Drainage on Dressing, but no strike through with dressing change  Plan:  Packing out  Topical bactroban BID  F/U with PCP as outpatient    Subjective/Objective   Chief Complaint: "No Pain"    Subjective: Alert, comfortable  Objective:     Blood pressure 125/70, pulse 92, temperature 98 2 °F (36 8 °C), temperature source Tympanic, resp  rate 18, height 6' (1 829 m), weight 83 kg (182 lb 15 7 oz), SpO2 98 %  ,Body mass index is 24 82 kg/m²        Intake/Output Summary (Last 24 hours) at 3/4/2020 0921  Last data filed at 3/3/2020 1600  Gross per 24 hour   Intake    Output 350 ml   Net -350 ml       Invasive Devices     Peripheral Intravenous Line            Peripheral IV 03/03/20 Right Wrist 1 day    Peripheral IV 03/03/20 Right;Ventral (anterior) Forearm less than 1 day                Physical Exam:   Focused exam  Facial cellulitis markedly improved  Packing out, with minimal purulent drainage on dressing    Lab, Imaging and other studies:  CBC: No results found for: WBC, HGB, HCT, MCV, PLT, ADJUSTEDWBC, MCH, MCHC, RDW, MPV, NRBC, CMP:   Lab Results   Component Value Date    SODIUM 136 03/04/2020    K 3 9 03/04/2020     03/04/2020    CO2 26 03/04/2020    BUN 10 03/04/2020    CREATININE 0 73 03/04/2020    CALCIUM 9 2 03/04/2020    EGFR 116 03/04/2020

## 2020-03-04 NOTE — NURSING NOTE
Patient is pleasant and talkative  Denies any pain or discomfort at this time  Dressing to forehead clean, dry and intact  Temp 98 7  Complex physical assessment as noted, see chart for details  Call bell in reach, readily verbalizes needs

## 2020-03-04 NOTE — DISCHARGE INSTR - APPOINTMENTS
Follow up appointments should be scheduled as indicated/needed  Family doctor should be notified of hospital visit ; appointment should be scheduled within 7 days after discharge

## 2020-03-04 NOTE — PROGRESS NOTES
Vancomycin Assessment    Robie Castleman is a 35 y o  male who is currently receiving vancomycin 1250mg iv q8h for skin-soft tissue infection, other abcess   Relevant clinical data and objective history reviewed:  Creatinine   Date Value Ref Range Status   03/04/2020 0 73 0 70 - 1 30 mg/dL Final     Comment:     Standardized to IDMS reference method   03/03/2020 0 80 0 70 - 1 30 mg/dL Final     Comment:     Standardized to IDMS reference method     /70 (BP Location: Right arm)   Pulse 92   Temp 98 2 °F (36 8 °C) (Tympanic)   Resp 18   Ht 6' (1 829 m)   Wt 83 kg (182 lb 15 7 oz)   SpO2 98%   BMI 24 82 kg/m²   I/O last 3 completed shifts: In: 1395 8 [IV Piggyback:1395 8]  Out: 500 [Urine:500]  Lab Results   Component Value Date/Time    BUN 10 03/04/2020 05:38 AM    WBC 8 40 03/03/2020 01:46 AM    HGB 16 5 03/03/2020 01:46 AM    HCT 49 9 (H) 03/03/2020 01:46 AM    MCV 95 03/03/2020 01:46 AM     03/03/2020 01:46 AM     Temp Readings from Last 3 Encounters:   03/04/20 98 2 °F (36 8 °C) (Tympanic)     Vancomycin Days of Therapy: 2    Assessment/Plan  The patient is currently on vancomycin utilizing scheduled dosing  Baseline risks associated with therapy include: none   The patient is receiving 1250mg iv q8h with the most recent vancomycin level being at steady-state and sub-therapeutic based on a goal of 15-20 (appropriate for most indications) ; therefore, after clinical evaluation will be changed to 1750mg iv q8h   Pharmacy will continue to follow closely for s/sx of nephrotoxicity, infusion reactions and appropriateness of therapy  BMP and CBC will be ordered per protocol  Plan for trough as patient approaches steady state, prior to the 4th  dose at approximately 1330 on 3/5/2020  Pharmacy will continue to follow the patients culture results and clinical progress daily      Stanley Gorman, Pharmacist

## 2020-03-04 NOTE — DISCHARGE SUMMARY
Discharge- Rush Cleveland 1980, 35 y o  male MRN: [id number]    Unit/Bed#: ICU 06 Encounter: [id number]    Primary Care Provider: Dia Platt DO   Date and time admitted to hospital: 3/3/2020  1:15 AM      * Facial cellulitis with abcess  Assessment & Plan  · Status post I&D with surgical team  · Clinically improving  · Afebrile with no leukocytosis  · Will discharge on oral antibiotics with doxycycline/Keflex  · Follow-up with PCP as outpatient  · Advised to return if he had worsening swelling, change in vision, or severe drainage/pain    Tobacco abuse  Assessment & Plan  · Counseled on smoking cessation      Discharging Physician / Practitioner: Moose Villar MD  PCP: Dia Platt DO  Admission Date:   Admission Orders (From admission, onward)     Ordered        03/03/20 0456  Inpatient Admission (expected length of stay for this patient Order details is greater than two midnights)  Once                   Discharge Date: 03/04/20    Resolved Problems  Date Reviewed: 3/3/2020    None          Consultations During Hospital Stay:  · Surgery    Procedures Performed:   · I&D at bedside    Significant Findings / Test Results:   · Cellulitis/abscess    Incidental Findings:   · None     Test Results Pending at Discharge (will require follow up):   · Culture results     Outpatient Tests Requested:  · Routine labs with PCP    Complications:     None    Reason for Admission:  [address] Course:     Rush Cleveland is a 35 y o  male patient who originally presented to the hospital on 3/3/2020 due to forehead wound with swelling  Patient was admitted and started on IV antibiotics  Surgery was consulted and patient had an I&D at the bedside  Symptoms did improve  Patient remained afebrile with no leukocytosis  Cultures were obtained and currently pending  Patient discharged on oral antibiotics with Keflex/doxycycline  Patient will follow-up with PCP as outpatient    Advised to return if he had any worsening in symptoms  Also advised to return if he had any change in vision  Please see above list of diagnoses and related plan for additional information  Condition at Discharge: stable     Discharge Day Visit / Exam:     Subjective:  No complaints today    Vitals: Blood Pressure: 125/70 (03/03/20 2346)  Pulse: 92 (03/03/20 2346)  Temperature: 98 2 °F (36 8 °C) (03/04/20 0600)  Temp Source: Tympanic (03/04/20 0600)  Respirations: 18 (03/03/20 2346)  Height: 6' (182 9 cm) (03/03/20 7308)  Weight - Scale: 83 kg (182 lb 15 7 oz) (03/03/20 0624)  SpO2: 98 % (03/03/20 2346)     Exam:   Physical Exam   Constitutional: He appears well-developed  No distress  HENT:   Head: Normocephalic and atraumatic  Swelling noted at nasal bridge and forehead   Eyes: Conjunctivae and EOM are normal    Slight swelling in eyelids improved from admission  No change in visual acuity   Neck: Normal range of motion  Neck supple  Cardiovascular: Normal rate and regular rhythm  Pulmonary/Chest: Effort normal  No respiratory distress  Abdominal: Soft  He exhibits no distension  Musculoskeletal: Normal range of motion  He exhibits no edema  Neurological: He is alert  No cranial nerve deficit  Skin: Skin is warm and dry  Wound of lower forehead with dressing in place   Psychiatric: He has a normal mood and affect  His behavior is normal        Discussion with Family:  No family available at bedside during my evaluation    Discharge instructions/Information to patient and family:   See after visit summary for information provided to patient and family  Provisions for Follow-Up Care:  See after visit summary for information related to follow-up care and any pertinent home health orders        Disposition:     Home    For Discharges to [facility] SNF:   · Not Applicable to this Patient - Not Applicable to this Patient    Planned Readmission:    no     Discharge Statement:  I spent 35 minutes discharging the patient  This time was spent on the day of discharge  I had direct contact with the patient on the day of discharge  Greater than 50% of the total time was spent examining patient, answering all patient questions, arranging and discussing plan of care with patient as well as directly providing post-discharge instructions  Additional time then spent on discharge activities  Discharge Medications:  See after visit summary for reconciled discharge medications provided to patient and family        ** Please Note: This note has been constructed using a voice recognition system **

## 2020-03-04 NOTE — ASSESSMENT & PLAN NOTE
· Status post I&D with surgical team  · Clinically improving  · Afebrile with no leukocytosis  · Will discharge on oral antibiotics with doxycycline/Keflex  · Follow-up with PCP as outpatient  · Advised to return if he had worsening swelling, change in vision, or severe drainage/pain

## 2020-03-04 NOTE — DISCHARGE INSTR - OTHER ORDERS
Notify doctor for vision changes, dizziness, headaches, fevers, N/V, increased facial swelling, foul discharge from wound area, and/or uncontrolled pain at wound site  Ensure to change dressing daily  Clean wound area with soap and water; pat dry  Apply Bactroban ointment with dressing change

## 2020-03-04 NOTE — DISCHARGE INSTRUCTIONS
Abscess   WHAT YOU NEED TO KNOW:   A warm compress may help your abscess drain  Your healthcare provider may make a cut in the abscess so it can drain  You may need surgery to remove an abscess that is on your hands or buttocks  DISCHARGE INSTRUCTIONS:   Return to the emergency department if:   · The area around your abscess becomes very painful, warm, or has red streaks  · You have a fever and chills  · Your heart is beating faster than usual      · You feel faint or confused  Contact your healthcare provider if:   · Your abscess gets bigger or does not get better  · Your abscess returns  · You have questions or concerns about your condition or care  Medicines: You may  need any of the following:  · Antibiotics  help treat a bacterial infection  · Acetaminophen  decreases pain and fever  It is available without a doctor's order  Ask how much to take and how often to take it  Follow directions  Acetaminophen can cause liver damage if not taken correctly  · NSAIDs , such as ibuprofen, help decrease swelling, pain, and fever  This medicine is available with or without a doctor's order  NSAIDs can cause stomach bleeding or kidney problems in certain people  If you take blood thinner medicine, always ask your healthcare provider if NSAIDs are safe for you  Always read the medicine label and follow directions  · Take your medicine as directed  Contact your healthcare provider if you think your medicine is not helping or if you have side effects  Tell him or her if you are allergic to any medicine  Keep a list of the medicines, vitamins, and herbs you take  Include the amounts, and when and why you take them  Bring the list or the pill bottles to follow-up visits  Carry your medicine list with you in case of an emergency  Self-care:   · Apply a warm compress to your abscess  This will help it open and drain  Wet a washcloth in warm, but not hot, water  Apply the compress for 10 minutes  Repeat this 4 times each day  Do not  press on an abscess or try to open it with a needle  You may push the bacteria deeper or into your blood  · Do not share your clothes, towels, or sheets with anyone  This can spread the infection to others  · Wash your hands often  This can help prevent the spread of germs  Use soap and water or an alcohol-based hand rub  Care for your wound after it is drained:   · Care for your wound as directed  If your healthcare provider says it is okay, carefully remove the bandage and gauze packing  You may need to soak the gauze to get it out of your wound  Clean your wound and the area around it as directed  Dry the area and put on new, clean bandages  Change your bandages when they get wet or dirty  · Ask your healthcare provider how to change the gauze in your wound  Keep track of how many pieces of gauze are placed inside the wound  Do not put too much packing in the wound  Do not pack the gauze too tightly in your wound  Follow up with your healthcare provider in 1 to 3 days: You may need to have your packing removed or your bandage changed  Write down your questions so you remember to ask them during your visits  © 2017 [address] [employer] Information is for End User's use only and may not be sold, redistributed or otherwise used for commercial purposes  All illustrations and images included in CareNotes® are the copyrighted property of [employer] D [employer] M [employer] Inc  or Jonathan Nicole  The above information is an  only  It is not intended as medical advice for individual conditions or treatments  Talk to your doctor, nurse or pharmacist before following any medical regimen to see if it is safe and effective for you  Abscess   WHAT YOU NEED TO KNOW:   What is an abscess? An abscess is an area under the skin where pus (infected fluid) collects  An abscess is often caused by bacteria, fungi or other germs that get into an open wound   You can get an abscess anywhere on your body  What increases my risk for an abscess? · An animal bite    · A foreign object lodged under your skin    · Heavy or frequent sweating    · A health problem, such as diabetes or obesity    · Injecting illegal drugs  What are the signs and symptoms of an abscess? You may have a swollen mass that is red and painful  Pus may leak out of the mass  The pus will be white or yellow and may smell bad  You may have redness and pain days before the mass appears  You may have a fever and chills if the infection spreads  How is an abscess diagnosed? Your healthcare provider will examine the area  He will check to see if your abscess is draining  A sample of fluid from your abscess may show what is causing your infection  How is an abscess treated? · Incision and drainage  is a procedure used to remove pus and fluid from the abscess  Your healthcare provider will make a cut in the abscess so it can drain  Then gauze will be put into the wound and it will be covered with a bandage  · Surgery  may be needed to remove your abscess  Your healthcare provider may do this if the abscess is on your hands or buttocks  Surgery can decrease the risk that the abscess will come back  What can I do to care for my self? · Apply a warm compress to your abscess  This will help it open and drain  Wet a washcloth in warm, but not hot, water  Apply the compress for 10 minutes  Repeat this 4 times each day  Do not  press on an abscess or try to open it with a needle  You may push the bacteria deeper or into your blood  · Do not share your clothes, towels, or sheets with anyone  This can spread the infection to others  · Wash your hands often  This can help prevent the spread of germs  Use soap and water or an alcohol-based hand rub  What can I do to care for my wound after it is drained? · Care for your wound as directed    If your healthcare provider says it is okay, carefully remove the bandage and gauze packing  You may need to soak the gauze to get it out of your wound  Clean your wound and the area around it as directed  Dry the area and put on new, clean bandages  Change your bandages when they get wet or dirty  · Ask your healthcare provider how to change the gauze in your wound  Keep track of how many pieces of gauze are placed inside the wound  Do not put too much packing in the wound  Do not pack the gauze too tightly in your wound  When should I seek immediate care? · The area around your abscess becomes very painful, warm, or has red streaks  · You have a fever and chills  · Your heart is beating faster than usual      · You feel faint or confused  When should I contact my healthcare provider? · Your abscess gets bigger  · Your abscess returns  · You have questions or concerns about your condition or care  CARE AGREEMENT:   You have the right to help plan your care  Learn about your health condition and how it may be treated  Discuss treatment options with your caregivers to decide what care you want to receive  You always have the right to refuse treatment  The above information is an  only  It is not intended as medical advice for individual conditions or treatments  Talk to your doctor, nurse or pharmacist before following any medical regimen to see if it is safe and effective for you  © 2017 [address] [employer]  Information is for End User's use only and may not be sold, redistributed or otherwise used for commercial purposes  All illustrations and images included in CareNotes® are the copyrighted property of [employer]  or [facility]  Cellulitis, Ambulatory Care   GENERAL INFORMATION:   Cellulitis  is a skin infection caused by bacteria         Common symptoms include the following:   · Fever    · A red, warm, swollen area on your skin    · Pain when the area is touched    · Bumps or blisters (abscess) that may drain pus    · Bumpy, raised skin that feels like an orange peel  Seek immediate care for the following symptoms:   · An increase in pain, redness, warmth, and size    · Red streaks coming from the infected area    · A thin, gray-brown discharge coming from your infected skin area    · A crackling under your skin when you touch it    · Purple dots or bumps on your skin, or bleeding under your skin    · New swelling and pain in your legs    · Sudden trouble breathing or chest pain  Treatment for cellulitis  may include medicines to treat the bacterial infection or decrease pain  The infection may need to be cleaned out  Damaged, dead, or infected tissue may need to be cut away to help your wound heal   Manage your symptoms:   · Elevate your wound above the level of your heart  as often as you can  This will help decrease swelling and pain  Prop your wound on pillows or blankets to keep it elevated comfortably  · Clean your wound as directed  You may need to wash the wound with soap and water  Look for signs of infection  · Wear pressure stockings as directed  The stockings are tight and put pressure on your legs  This improves blood flow and decreases swelling  Prevent cellulitis:   · Wash your hands often  Use soap and water  Wash your hands after you use the bathroom, change a child's diapers, or sneeze  Wash your hands before you prepare or eat food  Use lotion to prevent dry, cracked skin  · Do not share personal items, such as towels, clothing, and razors  · Clean exercise equipment  with germ-killing  before and after you use it  Follow up with your healthcare provider as directed:  Write down your questions so you remember to ask them during your visits  CARE AGREEMENT:   You have the right to help plan your care  Learn about your health condition and how it may be treated  Discuss treatment options with your caregivers to decide what care you want to receive  You always have the right to refuse treatment  The above information is an  only  It is not intended as medical advice for individual conditions or treatments  Talk to your doctor, nurse or pharmacist before following any medical regimen to see if it is safe and effective for you  © 2014 [address] is for End User's use only and may not be sold, redistributed or otherwise used for commercial purposes  All illustrations and images included in CareNotes® are the copyrighted property of [employer]  or [facility]   WHAT YOU NEED TO KNOW:   A warm compress may help your abscess drain  Your healthcare provider may make a cut in the abscess so it can drain  You may need surgery to remove an abscess that is on your hands or buttocks  DISCHARGE INSTRUCTIONS:   Return to the emergency department if:   · The area around your abscess becomes very painful, warm, or has red streaks  · You have a fever and chills  · Your heart is beating faster than usual      · You feel faint or confused  Contact your healthcare provider if:   · Your abscess gets bigger or does not get better  · Your abscess returns  · You have questions or concerns about your condition or care  Medicines: You may  need any of the following:  · Antibiotics  help treat a bacterial infection  · Acetaminophen  decreases pain and fever  It is available without a doctor's order  Ask how much to take and how often to take it  Follow directions  Acetaminophen can cause liver damage if not taken correctly  · NSAIDs , such as ibuprofen, help decrease swelling, pain, and fever  This medicine is available with or without a doctor's order  NSAIDs can cause stomach bleeding or kidney problems in certain people  If you take blood thinner medicine, always ask your healthcare provider if NSAIDs are safe for you  Always read the medicine label and follow directions      · Take your medicine as directed  Contact your healthcare provider if you think your medicine is not helping or if you have side effects  Tell him or her if you are allergic to any medicine  Keep a list of the medicines, vitamins, and herbs you take  Include the amounts, and when and why you take them  Bring the list or the pill bottles to follow-up visits  Carry your medicine list with you in case of an emergency  Self-care:   · Apply a warm compress to your abscess  This will help it open and drain  Wet a washcloth in warm, but not hot, water  Apply the compress for 10 minutes  Repeat this 4 times each day  Do not  press on an abscess or try to open it with a needle  You may push the bacteria deeper or into your blood  · Do not share your clothes, towels, or sheets with anyone  This can spread the infection to others  · Wash your hands often  This can help prevent the spread of germs  Use soap and water or an alcohol-based hand rub  Care for your wound after it is drained:   · Care for your wound as directed  If your healthcare provider says it is okay, carefully remove the bandage and gauze packing  You may need to soak the gauze to get it out of your wound  Clean your wound and the area around it as directed  Dry the area and put on new, clean bandages  Change your bandages when they get wet or dirty  · Ask your healthcare provider how to change the gauze in your wound  Keep track of how many pieces of gauze are placed inside the wound  Do not put too much packing in the wound  Do not pack the gauze too tightly in your wound  Follow up with your healthcare provider in 1 to 3 days: You may need to have your packing removed or your bandage changed  Write down your questions so you remember to ask them during your visits  © 2017 [employer] Information is for End User's use only and may not be sold, redistributed or otherwise used for commercial purposes   All illustrations and images included in [address] are the copyrighted property of [employer]  or Jonathan Nicole  The above information is an  only  It is not intended as medical advice for individual conditions or treatments  Talk to your doctor, nurse or pharmacist before following any medical regimen to see if it is safe and effective for you  Abscess   AMBULATORY CARE:   An abscess  is an area under the skin where pus (infected fluid) collects  An abscess is often caused by bacteria, fungi or other germs that get into an open wound  You can get an abscess anywhere on your body  Common signs and symptoms of an abscess: You may have a swollen mass that is red and painful  Pus may leak out of the mass  The pus will be white or yellow and may smell bad  You may have redness and pain days before the mass appears  You may have a fever and chills if the infection spreads  Seek immediate care if:   · The area around your abscess becomes very painful, warm, or has red streaks  · You have a fever and chills  · Your heart is beating faster than usual      · You feel faint or confused  Contact your healthcare provider if:   · Your abscess gets bigger or does not get better  · Your abscess returns  · You have questions or concerns about your condition or care  Treatment for an abscess: Your healthcare provider may need to make a cut in the abscess to allow the pus to drain  You may need surgery to remove your abscess  You may  need any of the following:  · Antibiotics  help treat a bacterial infection  · Acetaminophen  decreases pain and fever  It is available without a doctor's order  Ask how much to take and how often to take it  Follow directions  Acetaminophen can cause liver damage if not taken correctly  · NSAIDs , such as ibuprofen, help decrease swelling, pain, and fever  This medicine is available with or without a doctor's order   NSAIDs can cause stomach bleeding or kidney problems in certain people  If you take blood thinner medicine, always ask your healthcare provider if NSAIDs are safe for you  Always read the medicine label and follow directions  · Take your medicine as directed  Contact your healthcare provider if you think your medicine is not helping or if you have side effects  Tell him or her if you are allergic to any medicine  Keep a list of the medicines, vitamins, and herbs you take  Include the amounts, and when and why you take them  Bring the list or the pill bottles to follow-up visits  Carry your medicine list with you in case of an emergency  Self-care:   · Apply a warm compress to your abscess  This will help it open and drain  Wet a washcloth in warm, but not hot, water  Apply the compress for 10 minutes  Repeat this 4 times each day  Do not  press on an abscess or try to open it with a needle  You may push the bacteria deeper or into your blood  · Do not share your clothes, towels, or sheets with anyone  This can spread the infection to others  · Wash your hands often  This can help prevent the spread of germs  Use soap and water or an alcohol-based hand rub  Care for your wound after it is drained:   · Care for your wound as directed  If your healthcare provider says it is okay, carefully remove the bandage and gauze packing  You may need to soak the gauze to get it out of your wound  Clean your wound and the area around it as directed  Dry the area and put on new, clean bandages  Change your bandages when they get wet or dirty  · Ask your healthcare provider how to change the gauze in your wound  Keep track of how many pieces of gauze are placed inside the wound  Do not put too much packing in the wound  Do not pack the gauze too tightly in your wound  Follow up with your healthcare provider in 1 to 3 days: You may need to have your packing removed or your bandage changed   Write down your questions so you remember to ask them during your visits  © 2017 [address] [facility] Information is for End User's use only and may not be sold, redistributed or otherwise used for commercial purposes  All illustrations and images included in CareNotes® are the copyrighted property of [employer] M [employer] Inc  or Jonathan Nicole  The above information is an  only  It is not intended as medical advice for individual conditions or treatments  Talk to your doctor, nurse or pharmacist before following any medical regimen to see if it is safe and effective for you  Abscess   Rice C: Skin and Soft Tissue Infections  In: Yanet Dhillon, mahesh MeyerMarshall Regional Medical CenterNas Clinical Practice of Emergency Medicine, 6th ed  [address] [facility],[address] [place] [address], [address]  Lucy Abbott DL , Silva AL , Lupe HF , et al: Practice guidelines for the diagnosis and management of skin and soft tissue infections: 2014 update by the infectious diseases society of [place]  Clin Infect Dis 2014; 59(2):e10-e52  Candie DANIELS & Yasmany Hernandez KB: Necrotizing Soft Tissue Infections  In: Candie Harvey, [employer], et al  [place]  [address] Emergency Medicine Consult, 4th ed  [address] [facility],[address] [place] [address], 2011  Bessy Oreilly: Incision and Drainage  In: Uziel Pierson  Clinical Procedures in Emergency Medicine, 5th ed  [address] , [place], [place], 2010  Reyna J: Lymphangitis  In: Candie Harvey, [employer], et al  [place]  [address] Emergency Medicine Consult, 4th ed  [address] [facility],[address] [place] [address], 2011  Kavon Wiley MS: Endocarditis  In: Candie Harvey, [employer], et al  [place]  [address] Emergency Medicine Consult, 4th ed  [address] [facility],[address] [place] [address], 2011  O'Colten N: Abscess, Skin/Soft Tissue  In: Candie Harvey, [employer], et al  [place]  [address] Emergency Medicine Consult, 4th ed   [address] [facility],[address] [place] [address], [place], 2011 © 2017 [address] [facility] Information is for End User's use only and may not be sold, redistributed or otherwise used for commercial purposes  All illustrations and images included in CareNotes® are the copyrighted property of [employer] D A M [employer] Inc  or Jonathan Nicole  The above information is an  only  It is not intended as medical advice for individual conditions or treatments  Talk to your doctor, nurse or pharmacist before following any medical regimen to see if it is safe and effective for you  Cellulitis, Ambulatory Care   Radha TORRES , Silva AL , Lupe HF , et al: Practice guidelines for the diagnosis and management of skin and soft tissue infections: 2014 update by the infectious diseases society of [place]  Clin Infect Dis 2014; 59(2):e10-e52  Allen P: Management of common bacterial infections of the skin  [place] Dis 2008; 21(2):122-128  Ana RB & Morgan T: Imaging modalities in wounds and superficial skin infections  Emerg Med Clin August Am 2007; 25(1):223-234  Peter [place]: Community-acquired methicillin-resistant Staphylococcus aureus skin infections: implications for patients and practitioners  Am J Clin Dermatol 2007; 8(5):259-270  Aleah M & Natalie ALESSIA: Acute bacterial skin infections and cellulitis  [place] Dis 2007; 20(2):118-123  Dajuan Duarte & Lavinia KG: Wound assessment  BMJ 2006; 332(1465):285-288  Yasmeen Pritchard, Velasquez PW, Suzi LM, et al: The appropriate use of antibiotics in surgery: a review of surgical infections  Curr Probl Surg 2007; 99(52):729-610  Jose Luis Patterson ME, & Mick LP: Approach to the patient with presumed cellulitis  Dawit Fanti Med Surg 2007; 26(3):168-178  Ambrose Arriaza: Bacterial infections: uncommon presentations  Clin Dermatol 2005; 23(5):503-508  Ileana Mao AM & [place]: Are blood cultures necessary in adults with cellulitis? Sean Plascencia [facility] Med 2005; 47(6):028-001    Juaquin ROBBINS: Cellulitis and erysipelas  Clin Evid 2006; 6643(77):4700-3017  [address] [place] [address] [address]: Diagnosis and management of Staphylococcus aureus infections of the skin and soft tissue  Intern Med J 2005; 35 Suppl 2:  Pravin KUNZ & Emi STOUT: Skin and soft tissue infections  Prim Care 2006; 33(3):697-710  Sisgabriel Delgado DL, Silva AL, Lupe HF, et al: Practice guidelines for the diagnosis and management of skin and soft-tissue infections  Clin Infect Dis 2005; 41(10):3087-5604  © 2014 [address] is for End User's use only and may not be sold, redistributed or otherwise used for commercial purposes  All illustrations and images included in CareNotes® are the copyrighted property of [employer]  or Jonathan Nicole  The above information is an  only  It is not intended as medical advice for individual conditions or treatments  Talk to your doctor, nurse or pharmacist before following any medical regimen to see if it is safe and effective for you

## 2020-03-06 LAB
BACTERIA WND AEROBE CULT: ABNORMAL
GRAM STN SPEC: ABNORMAL
GRAM STN SPEC: ABNORMAL

## 2020-03-08 LAB — BACTERIA BLD CULT: NORMAL

## 2020-03-09 LAB — BACTERIA BLD CULT: NORMAL
